# Patient Record
Sex: MALE | URBAN - METROPOLITAN AREA
[De-identification: names, ages, dates, MRNs, and addresses within clinical notes are randomized per-mention and may not be internally consistent; named-entity substitution may affect disease eponyms.]

---

## 2021-05-25 ENCOUNTER — OFFICE VISIT (OUTPATIENT)
Dept: URBAN - METROPOLITAN AREA CLINIC 71 | Facility: CLINIC | Age: 61
End: 2021-05-25
Payer: COMMERCIAL

## 2021-05-25 DIAGNOSIS — H52.4 PRESBYOPIA: ICD-10-CM

## 2021-05-25 DIAGNOSIS — H25.813 COMBINED FORMS OF AGE-RELATED CATARACT, BILATERAL: Primary | ICD-10-CM

## 2021-05-25 PROCEDURE — 99204 OFFICE O/P NEW MOD 45 MIN: CPT | Performed by: OPTOMETRIST

## 2021-05-25 ASSESSMENT — VISUAL ACUITY
OS: 20/40
OD: 20/30

## 2021-05-25 ASSESSMENT — INTRAOCULAR PRESSURE
OS: 18
OD: 16

## 2021-05-25 NOTE — IMPRESSION/PLAN
Impression: Combined forms of age-related cataract, bilateral: H25.813. Plan: The patients cataracts have progressed and that's what has caused the large shift in his prescription. advised PT that even if he gets new glasses his vision will improve but not be perfect. Pt reports he is going through gallbladder cancer at the moment and wants to hold off on any CAT SX. Will continue to monitor until vision/glare worsens. Recommend when PT is feeling ready for CAT SX to call in and schedule a CAT EVAL.

## 2021-05-25 NOTE — IMPRESSION/PLAN
Impression: Presbyopia: H52.4. Plan: Presbyopia is the inability to focus on objects (ie: accommodate) due to the loss of flexibility of your natural lens. Presbyopia occurs with age. Reading glasses, bifocals, trifocals or contacts can be helpful. Contact the office if difficulty focusing persists despite corrective eye wear. New glasses RX given today for DVO and PT will take them off to read.